# Patient Record
(demographics unavailable — no encounter records)

---

## 2024-11-11 NOTE — HISTORY OF PRESENT ILLNESS
[de-identified] : Patient is a 49-year-old female here for evaluation of right knee.  Patient has been having right knee pain that comes and goes for about 1 month.  Patient states a few days ago her knee pain worsens.  Patient went to hospital about 1 week ago x-rays of the right knee were taken showing no acute fracture.  Patient followed up with her primary physician and MRI was ordered of the right knee showing no acute fracture.  MRI was read positive for meniscal tear, inflammation.  Patient states still pain comes and goes worsens with activities especially when she standing on her feet for long peers of time denies sometimes feeling of instability of the right knee.  Reviewed right knee imaging in detail with patient showing meniscal tear, inflammation, internal derangement.  Patient's pain is consistent with internal derangement/meniscal tear.  Discussed treatment options detail including rest, associate range of motion excise physical therapy patient will continue taking diclofenac as needed for pain as prescribed by primary physician.  Patient states that her hemoglobin A1c is around 9.0, due to this patient is currently not a candidate to have cortisone injection.  Discussed other treatment options in detail including rest, ice/heat, range of motion exercise, physical therapy, physical therapy prescription given.  Patient will follow-up with sports department in 6 to 8 weeks for reevaluation.  Discussed with patient in detail that if she fails conservative treatment she may be a candidate for arthroscopic surgery.  Patient will call office if symptoms worsen or change.  Patient understands agrees with plan